# Patient Record
Sex: FEMALE | Race: WHITE
[De-identification: names, ages, dates, MRNs, and addresses within clinical notes are randomized per-mention and may not be internally consistent; named-entity substitution may affect disease eponyms.]

---

## 2019-11-09 ENCOUNTER — HOSPITAL ENCOUNTER (EMERGENCY)
Dept: HOSPITAL 25 - UCEAST | Age: 19
Discharge: HOME | End: 2019-11-09
Payer: COMMERCIAL

## 2019-11-09 VITALS — SYSTOLIC BLOOD PRESSURE: 143 MMHG | DIASTOLIC BLOOD PRESSURE: 90 MMHG

## 2019-11-09 DIAGNOSIS — B37.3: Primary | ICD-10-CM

## 2019-11-09 PROCEDURE — G0463 HOSPITAL OUTPT CLINIC VISIT: HCPCS

## 2019-11-09 PROCEDURE — 99212 OFFICE O/P EST SF 10 MIN: CPT

## 2019-11-09 NOTE — UC
Complaint Female HPI





- HPI Summary


HPI Summary: 





Pt has been on Minocycline for about 2 months for acne and the past 2 days has 

had what she thinks is a vaginal yeast infection with vaginal itching, white 

discharge. Pt is sexually active with one partner. Pt stats she has similar 

symptoms premenstrually about 2 weeks ago but it resolved after her period. 





- History Of Current Complaint


Chief Complaint: [facility]


Stated Complaint: PERSONAL ISSUE


Time Seen by Provider: 11/09/19 16:34


Hx Obtained From: Patient


Pregnant?: No


Onset/Duration: Gradual Onset


Timing: Constant


Severity Initially: Mild


Severity Currently: Mild


Pain Intensity: 0


Character: Burning


Aggravating Factor(s): Other - Mild vaginal burning


Associated Signs And Symptoms: Positive: Vaginal Bleeding/Discharge, Vaginal 

Discharge - Whitish vaginal discharge, genital itching





- Allergies/Home Medications


Allergies/Adverse Reactions: 


 Allergies











Allergy/AdvReac Type Severity Reaction Status Date / Time


 


No Known Allergies Allergy   Verified 11/09/19 16:31











Home Medications: 


 Home Medications





Clindamycin/Tretinoin [Clindamycin Phosphate/Rolando 1.2-0.025 %] 1 gel TOPICAL 

DAILY 11/09/19 [History Confirmed 11/09/19]


Minocycline HCl [Minocycline Hydrochloride] 100 mg PO BID 11/09/19 [History 

Confirmed 11/09/19]


Multivitamin [Multivitamins] 1 cap PO DAILY 11/09/19 [History Confirmed 11/09/19

]


l-Norgest/E.estradiol-E.estrad [Levono-E Estrad 0.10-0.02-0.01] 1 each PO DAILY 

11/09/19 [History Confirmed 11/09/19]











PMH/Surg Hx/FS Hx/Imm Hx


Previously Healthy: Yes





- Surgical History


Surgical History: None





- Family History


Known Family History: Positive: Non-Contributory





- Social History


Occupation: Student


Lives: Dormitory/Roommates


Alcohol Use: None


Substance Use Type: None


Smoking Status (MU): Never Smoked Tobacco





Review of Systems


All Other Systems Reviewed And Are Negative: Yes


Genitourinary: Positive: Vaginal/Penile Burning, Vaginal/Penile Itching, Vaginal

/Penile Discharge - whitish discharge


Is Patient Immunocompromised?: No





Physical Exam


Triage Information Reviewed: Yes


Appearance: Well-Appearing, No Pain Distress, Well-Nourished


Vital Signs: 


 Initial Vital Signs











Temp  99.1 F   11/09/19 16:26


 


Pulse  75   11/09/19 16:26


 


Resp  16   11/09/19 16:26


 


BP  152/67   11/09/19 16:26


 


Pulse Ox  100   11/09/19 16:26











Vital Signs Reviewed: Yes


Respiratory: Positive: Lungs clear, Normal breath sounds, No respiratory 

distress, No accessory muscle use


Cardiovascular: Positive: RRR, No Murmur, Pulses Normal, Brisk Capillary Refill


Abdomen Description: Positive: Nontender, No Organomegaly, Soft.  Negative: CVA 

Tenderness (R), CVA Tenderness (L), Distended, Guarding, Hepatomegaly, McBurney'

s Point Tenderness, Splenomegaly


Bowel Sounds: Positive: Present


Musculoskeletal Exam: Normal


Neurological Exam: Normal


Psychological Exam: Normal


Skin Exam: Normal





 Complaint Female Dx





- Course


Course Of Treatment: 





Because of the patient's history of being on the Minocycline, I am going to 

treat for a vaginal yeast infection. She is to stop the Minocycline for tonight 

and tomorrow and call her dermatologist Monday morning to see if she should 

continue the Minocycline or not.





- Differential Dx/Diagnosis


Provider Diagnosis: 


 Vaginal yeast infection








Discharge ED





- Sign-Out/Discharge


Documenting (check all that apply): Patient Departure


All imaging exams completed and their final reports reviewed: No Studies





- Discharge Plan


Condition: Good


Disposition: HOME


Prescriptions: 


Fluconazole 150 MG TAB* [Diflucan 150 MG TAB*] 150 mg PO UC ONCE 1 Days #1 

tablet


Patient Education Materials:  Yeast Infection (ED)


Referrals: 


No Primary Care Phys,NOPCP [Primary Care Provider] - 


[facility] [Outside]


Additional Instructions: 


Stop the Minocycline until Monday and call your dermatologist Monday morning.





- Billing Disposition and Condition


Condition: GOOD


Disposition: Home





- Attestation Statements


Provider Attestation: 





Per institutional requirements, I have reviewed the chart, however, I was not 

consulted specifically or made aware of this patient by the  midlevel provider.

  I did not personally evaluate, interact with , or disposition  this patient.

## 2022-11-09 ENCOUNTER — APPOINTMENT (OUTPATIENT)
Dept: INTERNAL MEDICINE | Facility: CLINIC | Age: 22
End: 2022-11-09

## 2022-11-09 VITALS
TEMPERATURE: 97.9 F | HEIGHT: 64 IN | RESPIRATION RATE: 17 BRPM | OXYGEN SATURATION: 97 % | SYSTOLIC BLOOD PRESSURE: 139 MMHG | HEART RATE: 89 BPM | WEIGHT: 150 LBS | BODY MASS INDEX: 25.61 KG/M2 | DIASTOLIC BLOOD PRESSURE: 77 MMHG

## 2022-11-09 DIAGNOSIS — Z78.9 OTHER SPECIFIED HEALTH STATUS: ICD-10-CM

## 2022-11-09 DIAGNOSIS — L70.9 ACNE, UNSPECIFIED: ICD-10-CM

## 2022-11-09 DIAGNOSIS — Z86.2 PERSONAL HISTORY OF DISEASES OF THE BLOOD AND BLOOD-FORMING ORGANS AND CERTAIN DISORDERS INVOLVING THE IMMUNE MECHANISM: ICD-10-CM

## 2022-11-09 DIAGNOSIS — Z00.00 ENCOUNTER FOR GENERAL ADULT MEDICAL EXAMINATION W/OUT ABNORMAL FINDINGS: ICD-10-CM

## 2022-11-09 PROCEDURE — 99385 PREV VISIT NEW AGE 18-39: CPT

## 2022-11-15 LAB
ALBUMIN SERPL ELPH-MCNC: 4.8 G/DL
ALP BLD-CCNC: 40 U/L
ALT SERPL-CCNC: 15 U/L
ANION GAP SERPL CALC-SCNC: 12 MMOL/L
AST SERPL-CCNC: 17 U/L
BASOPHILS # BLD AUTO: 0.04 K/UL
BASOPHILS NFR BLD AUTO: 0.5 %
BILIRUB SERPL-MCNC: 0.4 MG/DL
BUN SERPL-MCNC: 11 MG/DL
CALCIUM SERPL-MCNC: 9.8 MG/DL
CHLORIDE SERPL-SCNC: 104 MMOL/L
CHOLEST SERPL-MCNC: 180 MG/DL
CO2 SERPL-SCNC: 23 MMOL/L
CREAT SERPL-MCNC: 0.61 MG/DL
EGFR: 128 ML/MIN/1.73M2
EOSINOPHIL # BLD AUTO: 0.04 K/UL
EOSINOPHIL NFR BLD AUTO: 0.5 %
GLUCOSE SERPL-MCNC: 88 MG/DL
HCT VFR BLD CALC: 40.8 %
HDLC SERPL-MCNC: 56 MG/DL
HGB BLD-MCNC: 13.1 G/DL
IMM GRANULOCYTES NFR BLD AUTO: 0.3 %
IRON SATN MFR SERPL: 18 %
IRON SERPL-MCNC: 102 UG/DL
LDLC SERPL CALC-MCNC: 113 MG/DL
LYMPHOCYTES # BLD AUTO: 2.36 K/UL
LYMPHOCYTES NFR BLD AUTO: 31.4 %
MAN DIFF?: NORMAL
MCHC RBC-ENTMCNC: 30.3 PG
MCHC RBC-ENTMCNC: 32.1 GM/DL
MCV RBC AUTO: 94.2 FL
MONOCYTES # BLD AUTO: 0.68 K/UL
MONOCYTES NFR BLD AUTO: 9 %
NEUTROPHILS # BLD AUTO: 4.38 K/UL
NEUTROPHILS NFR BLD AUTO: 58.3 %
NONHDLC SERPL-MCNC: 124 MG/DL
PLATELET # BLD AUTO: 349 K/UL
POTASSIUM SERPL-SCNC: 3.6 MMOL/L
PROT SERPL-MCNC: 7.2 G/DL
RBC # BLD: 4.33 M/UL
RBC # FLD: 12.9 %
SODIUM SERPL-SCNC: 139 MMOL/L
TIBC SERPL-MCNC: 554 UG/DL
TRIGL SERPL-MCNC: 60 MG/DL
TSH SERPL-ACNC: 1.63 UIU/ML
UIBC SERPL-MCNC: 452 UG/DL
WBC # FLD AUTO: 7.52 K/UL

## 2023-01-02 ENCOUNTER — TRANSCRIPTION ENCOUNTER (OUTPATIENT)
Age: 23
End: 2023-01-02